# Patient Record
Sex: FEMALE | Race: WHITE | NOT HISPANIC OR LATINO | URBAN - METROPOLITAN AREA
[De-identification: names, ages, dates, MRNs, and addresses within clinical notes are randomized per-mention and may not be internally consistent; named-entity substitution may affect disease eponyms.]

---

## 2017-05-26 ENCOUNTER — APPOINTMENT (RX ONLY)
Dept: URBAN - METROPOLITAN AREA CLINIC 130 | Facility: CLINIC | Age: 22
Setting detail: DERMATOLOGY
End: 2017-05-26

## 2017-05-26 DIAGNOSIS — L20.89 OTHER ATOPIC DERMATITIS: ICD-10-CM

## 2017-05-26 PROBLEM — J45.909 UNSPECIFIED ASTHMA, UNCOMPLICATED: Status: ACTIVE | Noted: 2017-05-26

## 2017-05-26 PROBLEM — L30.9 DERMATITIS, UNSPECIFIED: Status: ACTIVE | Noted: 2017-05-26

## 2017-05-26 PROCEDURE — 99202 OFFICE O/P NEW SF 15 MIN: CPT

## 2017-05-26 PROCEDURE — ? TREATMENT REGIMEN

## 2017-05-26 PROCEDURE — ? COUNSELING

## 2017-05-26 PROCEDURE — ? PRESCRIPTION

## 2017-05-26 RX ORDER — FLUTICASONE PROPIONATE 0.05 %
CREAM (GRAM) TOPICAL
Qty: 1 | Refills: 1 | Status: ERX | COMMUNITY
Start: 2017-05-26

## 2017-05-26 RX ADMIN — Medication: at 20:39

## 2017-05-26 ASSESSMENT — LOCATION ZONE DERM: LOCATION ZONE: TRUNK

## 2017-05-26 ASSESSMENT — LOCATION DETAILED DESCRIPTION DERM: LOCATION DETAILED: RIGHT PERIAREOLAR BREAST 7-8:00 REGION

## 2017-05-26 ASSESSMENT — LOCATION SIMPLE DESCRIPTION DERM: LOCATION SIMPLE: RIGHT BREAST

## 2017-05-26 NOTE — PROCEDURE: TREATMENT REGIMEN
Continue Regimen: moisturizers\\ngentle products
Detail Level: Zone
Initiate Treatment: Fluticasone cream qd-bid

## 2017-05-26 NOTE — HPI: RASH
How Severe Is Your Rash?: moderate
Is This A New Presentation, Or A Follow-Up?: Rash
Additional History: Started on right breast and spread to right trunk a little.